# Patient Record
(demographics unavailable — no encounter records)

---

## 2024-10-21 NOTE — END OF VISIT
[Time Spent: ___ minutes] : I have spent [unfilled] minutes of time on the encounter which excludes teaching and separately reported services.
 used

## 2024-10-21 NOTE — PHYSICAL EXAM
[General Appearance - Alert] : alert [General Appearance - In No Acute Distress] : in no acute distress [Oriented To Time, Place, And Person] : oriented to person, place, and time [Affect] : the affect was normal [Person] : oriented to person [Place] : oriented to place [Time] : oriented to time [Cranial Nerves Oculomotor (III)] : extraocular motion intact [Cranial Nerves Trigeminal (V)] : facial sensation intact symmetrically [Cranial Nerves Facial (VII)] : face symmetrical [Cranial Nerves Vestibulocochlear (VIII)] : hearing was intact bilaterally [Cranial Nerves Accessory (XI - Cranial And Spinal)] : head turning and shoulder shrug symmetric [Cranial Nerves Hypoglossal (XII)] : there was no tongue deviation with protrusion [Motor Strength] : muscle strength was normal in all four extremities [Sclera] : the sclera and conjunctiva were normal [Full Visual Field] : full visual field [Outer Ear] : the ears and nose were normal in appearance [] : no respiratory distress [Abnormal Walk] : normal gait

## 2024-10-23 NOTE — ASSESSMENT
[FreeTextEntry1] :  My impression is that the patient suffers from a pituitary adenoma.   The patients established problem of elevated prolactin and low testosterone/libido are related . The differential diagnosis is Prolactinoma vs stalk effect with a prolactin of 203.  I had a long discussion with the patient regarding the role of medical management vs surgical resection vs observation.  The patient was extensively educated about the nature of his disease process. Therapeutic and diagnostic tests include Repeat MRI Sella at [facility] if surgery chosen- at least 1 week before surgery.  The patient should see endocrinology for management of hormones.  They will think about surgery. I have explained the alternatives, risks and benefits to the patient and he understands and agrees to proceed.  This risk of the procedure including but not limited to pain, infection, seizure, stroke, recurrence, residual disease, neurovascular injury, heart attack, pulmonary embolism, blindness, weakness, paralysis and death have been carefully explained to the patient who clearly understands and agrees to proceed.

## 2024-10-23 NOTE — ASSESSMENT
[FreeTextEntry1] :  My impression is that the patient suffers from a pituitary adenoma.   The patients established problem of elevated prolactin and low testosterone/libido are related . The differential diagnosis is Prolactinoma vs stalk effect with a prolactin of 203.  I had a long discussion with the patient regarding the role of medical management vs surgical resection vs observation.  The patient was extensively educated about the nature of his disease process. Therapeutic and diagnostic tests include Repeat MRI Sella at [facility] if surgery chosen- at least 1 week before surgery.  The patient should see endocrinology for management of hormones.  They will think about surgery. I have explained the alternatives, risks and benefits to the patient and he understands and agrees to proceed.  This risk of the procedure including but not limited to pain, infection, seizure, stroke, recurrence, residual disease, neurovascular injury, heart attack, pulmonary embolism, blindness, weakness, paralysis and death have been carefully explained to the patient who clearly understands and agrees to proceed.

## 2024-10-23 NOTE — ASSESSMENT
[FreeTextEntry1] :  My impression is that the patient suffers from a pituitary adenoma.   The patients established problem of elevated prolactin and low testosterone/libido are related . The differential diagnosis is Prolactinoma vs stalk effect with a prolactin of 203.  I had a long discussion with the patient regarding the role of medical management vs surgical resection vs observation.  The patient was extensively educated about the nature of his disease process. Therapeutic and diagnostic tests include Repeat MRI Sella at [facility] if surgery chosen- at least 1 week before surgery.  The patient should see endocrinology for management of hormones.  They will think about surgery. I have explained the alternatives, risks and benefits to the patient and he understands and agrees to proceed.  This risk of the procedure including but not limited to pain, infection, seizure, stroke, recurrence, residual disease, neurovascular injury, heart attack, pulmonary embolism, blindness, weakness, paralysis and death have been carefully explained to the patient who clearly understands and agrees to proceed.

## 2024-10-23 NOTE — DATA REVIEWED
[de-identified] : I have reviewed the most recent MRI on10/10/24  at [facility] which shows right sided pituitary lesion.

## 2024-10-23 NOTE — HISTORY OF PRESENT ILLNESS
[de-identified] : SHANTA PUGH  is a 54 year old right handed male with PMH HLD with complaints of elevated prolactin in July 2024 and sent by PCP 10/2024 for pituitary MRI which showed a pituitary adenoma. States 2 years prior after COVID vaccine body "body changing ( breast growth-nipple sensitivity). Decreased libido and requested testosterone levels. No nipple discharge. Also endorsing erectile dysfunction. Was started on testosterone injections in summer 2024 and reported improvement afterward.   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

## 2024-10-23 NOTE — DATA REVIEWED
[de-identified] : I have reviewed the most recent MRI on10/10/24  at [facility] which shows right sided pituitary lesion.

## 2024-10-23 NOTE — HISTORY OF PRESENT ILLNESS
[de-identified] : SHANTA PUGH  is a 54 year old right handed male with PMH HLD with complaints of elevated prolactin in July 2024 and sent by PCP 10/2024 for pituitary MRI which showed a pituitary adenoma. States 2 years prior after COVID vaccine body "body changing ( breast growth-nipple sensitivity). Decreased libido and requested testosterone levels. No nipple discharge. Also endorsing erectile dysfunction. Was started on testosterone injections in summer 2024 and reported improvement afterward.   Born and Raised in [place] Did service in  in 1988 in [place]  Labwork 7/5/2024: Prolactin- 203 Testosterone- 29 FSH: 1.2 L (1.5 - 12.4) LH: <0.3 L (1.7-12.4) TSH 2.840   Patient accompanied by his wife PCP: MD Denis Okeefe

## 2024-10-23 NOTE — DATA REVIEWED
[de-identified] : I have reviewed the most recent MRI on10/10/24  at [facility] which shows right sided pituitary lesion.

## 2024-10-23 NOTE — HISTORY OF PRESENT ILLNESS
[de-identified] : SHANTA PUGH  is a 54 year old right handed male with PMH HLD with complaints of elevated prolactin in July 2024 and sent by PCP 10/2024 for pituitary MRI which showed a pituitary adenoma. States 2 years prior after COVID vaccine body "body changing ( breast growth-nipple sensitivity). Decreased libido and requested testosterone levels. No nipple discharge. Also endorsing erectile dysfunction. Was started on testosterone injections in summer 2024 and reported improvement afterward.   Born and Raised in [place] Did service in  in 1988 in [place]  Labwork 7/5/2024: Prolactin- 203 Testosterone- 29 FSH: 1.2 L (1.5 - 12.4) LH: <0.3 L (1.7-12.4) TSH 2.840   Patient accompanied by his wife PCP: MD Denis Okeefe

## 2024-11-22 NOTE — REASON FOR VISIT
[Initial Evaluation] : an initial evaluation [Pituitary Evaluation/ Disorder] : pituitary evaluation/disorder [FreeTextEntry2] : Dr Walter

## 2024-11-22 NOTE — DATA REVIEWED
[FreeTextEntry1] : 10/24:  prolactin 199, tot T 522 (with T injection), TSH 2.28, cortisol 6.9, IGF1 105 7/24:  prolactin 203, tot T 29, LH < 0.3, FSH 1.2, A1c 5.9%, TSH 2.84, estradiol 21.9  MRI, sella, 11/24:  8.1 x 9.8mm adenoma in central to R side of pituitary.  Stalk deviated to left side. MRI pituitary, 10/24:  8 x 8 x 6mm pit adenoma.  Stalk deviated to left.

## 2024-11-22 NOTE — HISTORY OF PRESENT ILLNESS
[FreeTextEntry1] : Here with wife. He was found to have elevated prolactin after seeing his PCP for increased weight. He had not told his PCP, but he was also having decreased libido for the past 9 months.  About 2 years ago, he noted areolar tenderness, but nipple discharge. He feels his testes have decreased in size; he estimates they are 10ml (comparing with orchidometer). His PCP gave him some testosterone injections;  he's had weekly injections for 3-4 weeks and right away, he felt better, more energy and his weight decreased.   Now that he has not had an injection recently, he feels more joint pain. labs reviewed from 10/24:  prolactin 199, tot T 522 (with T injection), TSH 2.28, cortisol 6.9, IGF1 105 labs reviewed from 7/24:  prolactin 203, tot T 29, LH < 0.3, FSH 1.2, A1c 5.9%, TSH 2.84, estradiol 21.9 MRI report reviewed from 11/24: 8.1 x 9.8mm adenoma in central to R side of pituitary.  Stalk deviated to left side. similar results from MRI 10/24:  8 x 8 x 6mm pit adenoma.  Stalk deviated to left.

## 2024-11-22 NOTE — ASSESSMENT
[FreeTextEntry1] : Pituitary adenoma, prolactin secreting.  Obesity BMI 35. I recommended starting cabergoline 0.5mg BIW to normalize prolactin and once prolactin is normal, testosterone production should recover.  He could take exogenous testosterone for faster hypogonadal symptom relief, but taking exogenous testosterone may delay his endogenous testosterone recovery.   He is agreeable to holding off on testosterone replacement for now. Cabergoline can also reduce pituitary adenoma size, though some adenomas do not respond or are resistant, in which case, then I will recommend surgery, if adenoma increases in size.  Since the adenoma is < 1cm, there is also good chance for a cure with surgery, if he does not wish to take medication, which I anticipate for at least a couple of years. His preference is to try medication first. RTO 3 months to recheck prolactin and testosterone

## 2024-12-31 NOTE — REASON FOR VISIT
[Home] : at home, [unfilled] , at the time of the visit. [Medical Office: ([facility])___] : at the medical office located in  [Patient] : the patient [Follow-Up: _____] : a [unfilled] follow-up visit

## 2025-01-06 NOTE — HISTORY OF PRESENT ILLNESS
[de-identified] : SHANTA PUGH  is a 54 year old right handed male with PMH HLD with complaints of elevated prolactin in July 2024 and sent by PCP 10/2024 for pituitary MRI which showed a pituitary adenoma. States 2 years prior after COVID vaccine he noticed his "body changing" ( breast growth-nipple sensitivity). Decreased libido and requested testosterone levels. No nipple discharge. Also endorsing erectile dysfunction. Was started on testosterone injections in summer 2024 and reported improvement afterward.   Born and Raised in [place] Did service in  in 1988 in [place]  Labwork 7/5/2024: Prolactin- 203 Testosterone- 29 FSH: 1.2 L (1.5 - 12.4) LH: <0.3 L (1.7-12.4) TSH 2.840   Patient accompanied by his wife PCP: MD Denis Okeefe    TODAY 01/06/2025 presents via telehealth to discuss treatment options. He recently had an endocrine evaluation with MD Clement 11/22/24- she recommended cabergoline 0.5mg Biweekly. Patient opted to delay exogenous testosterone.

## 2025-01-06 NOTE — ASSESSMENT
[FreeTextEntry1] :  My impression is that the patient suffers from a pituitary adenoma.   The patients established problem of elevated prolactin and low testosterone/libido are related . The differential diagnosis is Prolactinoma vs stalk effect with a prolactin of 203.  I had a long discussion with the patient regarding the role of medical management vs surgical resection vs observation.  The patient was extensively educated about the nature of his disease process. Therapeutic and diagnostic tests include Repeat MRI Sella at [facility] if surgery chosen- at least 1 week before surgery.  The patient should see endocrinology for management of hormones.  They will think about surgery. I have explained the alternatives, risks and benefits to the patient and he understands and agrees to proceed.  This risk of the procedure including but not limited to pain, infection, seizure, stroke, recurrence, residual disease, neurovascular injury, heart attack, pulmonary embolism, blindness, weakness, paralysis and death have been carefully explained to the patient who clearly understands and agrees to proceed.

## 2025-01-06 NOTE — ASSESSMENT
[FreeTextEntry1] :  My impression is that the patient suffers from a pituitary adenoma.   The patients established problem of elevated prolactin and low testosterone/libido are related . The differential diagnosis is Prolactinoma vs stalk effect with a prolactin of 203.  I had a long discussion with the patient regarding the role of medical management vs surgical resection vs observation.  The patient was extensively educated about the nature of his disease process. Therapeutic and diagnostic tests include Repeat MRI Sella at [facility] if surgery chosen- at least 1 week before surgery.  The patient should see endocrinology for management of hormones.  They will think about surgery. I have explained the alternatives, risks and benefits to the patient and he understands and agrees to proceed.  This risk of the procedure including but not limited to pain, infection, seizure, stroke, recurrence, residual disease, neurovascular injury, heart attack, pulmonary embolism, blindness, weakness, paralysis and death have been carefully explained to the patient who clearly understands and agrees to proceed.

## 2025-01-06 NOTE — HISTORY OF PRESENT ILLNESS
[de-identified] : SHANTA PUGH  is a 54 year old right handed male with PMH HLD with complaints of elevated prolactin in July 2024 and sent by PCP 10/2024 for pituitary MRI which showed a pituitary adenoma. States 2 years prior after COVID vaccine he noticed his "body changing" ( breast growth-nipple sensitivity). Decreased libido and requested testosterone levels. No nipple discharge. Also endorsing erectile dysfunction. Was started on testosterone injections in summer 2024 and reported improvement afterward.   Born and Raised in [place] Did service in  in 1988 in [place]  Labwork 7/5/2024: Prolactin- 203 Testosterone- 29 FSH: 1.2 L (1.5 - 12.4) LH: <0.3 L (1.7-12.4) TSH 2.840   Patient accompanied by his wife PCP: MD Denis Okeefe    TODAY 01/06/2025 presents via telehealth to discuss treatment options. He recently had an endocrine evaluation with MD Clement 11/22/24- she recommended cabergoline 0.5mg Biweekly. Patient opted to delay exogenous testosterone.